# Patient Record
Sex: FEMALE | Race: BLACK OR AFRICAN AMERICAN | Employment: OTHER | URBAN - METROPOLITAN AREA
[De-identification: names, ages, dates, MRNs, and addresses within clinical notes are randomized per-mention and may not be internally consistent; named-entity substitution may affect disease eponyms.]

---

## 2017-08-07 ENCOUNTER — HOSPITAL ENCOUNTER (OUTPATIENT)
Dept: NUCLEAR MEDICINE | Age: 42
Discharge: HOME | End: 2017-09-02
Attending: INTERNAL MEDICINE | Admitting: INTERNAL MEDICINE

## 2017-08-11 ENCOUNTER — HOSPITAL ENCOUNTER (OUTPATIENT)
Dept: NUCLEAR MEDICINE | Age: 42
Discharge: HOME | End: 2017-08-11
Attending: INTERNAL MEDICINE | Admitting: INTERNAL MEDICINE

## 2017-08-11 DIAGNOSIS — R10.11 ABDOMINAL PAIN, RIGHT UPPER QUADRANT: ICD-10-CM

## 2017-08-11 RX ADMIN — Medication 6 MILLICURIE: at 08:50

## 2018-03-28 ENCOUNTER — HOSPITAL ENCOUNTER (OUTPATIENT)
Dept: INFUSION THERAPY | Age: 43
Discharge: HOME | End: 2018-03-31
Attending: PSYCHIATRY & NEUROLOGY | Admitting: PSYCHIATRY & NEUROLOGY

## 2018-03-28 VITALS
WEIGHT: 133 LBS | HEIGHT: 67 IN | BODY MASS INDEX: 20.88 KG/M2 | RESPIRATION RATE: 16 BRPM | SYSTOLIC BLOOD PRESSURE: 103 MMHG | HEART RATE: 68 BPM | TEMPERATURE: 99 F | DIASTOLIC BLOOD PRESSURE: 86 MMHG

## 2018-03-28 RX ORDER — GABAPENTIN 400 MG/1
400 CAPSULE ORAL 3 TIMES DAILY
COMMUNITY
End: 2020-11-02

## 2018-03-28 RX ORDER — SODIUM CHLORIDE 0.9 % (FLUSH) 0.9 %
10 SYRINGE (ML) INJECTION PRN
Status: ACTIVE | OUTPATIENT
Start: 2018-03-28 | End: 2018-03-28

## 2018-03-28 RX ADMIN — Medication 10 ML: at 09:53

## 2018-03-28 RX ADMIN — Medication 10 ML: at 11:05

## 2018-03-28 RX ADMIN — Medication 10 ML: at 09:52

## 2018-03-28 RX ADMIN — Medication 10 ML: at 09:00

## 2018-03-29 ENCOUNTER — HOSPITAL ENCOUNTER (OUTPATIENT)
Dept: INFUSION THERAPY | Age: 43
Discharge: HOME | End: 2018-03-29
Attending: PSYCHIATRY & NEUROLOGY

## 2018-03-29 VITALS
TEMPERATURE: 97.2 F | HEART RATE: 59 BPM | DIASTOLIC BLOOD PRESSURE: 60 MMHG | RESPIRATION RATE: 16 BRPM | SYSTOLIC BLOOD PRESSURE: 112 MMHG

## 2018-03-29 RX ORDER — METHYLPREDNISOLONE SODIUM SUCCINATE 1 G/16ML
1000 INJECTION, POWDER, LYOPHILIZED, FOR SOLUTION INTRAMUSCULAR; INTRAVENOUS ONCE
Status: DISCONTINUED | OUTPATIENT
Start: 2018-03-29 | End: 2018-03-29

## 2018-03-29 RX ORDER — SODIUM CHLORIDE 0.9 % (FLUSH) 0.9 %
10 SYRINGE (ML) INJECTION PRN
Status: DISCONTINUED | OUTPATIENT
Start: 2018-03-29 | End: 2018-03-30 | Stop reason: HOSPADM

## 2018-03-29 RX ORDER — SODIUM CHLORIDE 9 MG/ML
INJECTION, SOLUTION INTRAVENOUS
Status: COMPLETED
Start: 2018-03-29 | End: 2018-03-29

## 2018-03-29 RX ORDER — SODIUM CHLORIDE 9 MG/ML
INJECTION, SOLUTION INTRAVENOUS ONCE
Status: DISCONTINUED | OUTPATIENT
Start: 2018-03-29 | End: 2018-03-30 | Stop reason: HOSPADM

## 2018-03-29 RX ADMIN — SODIUM CHLORIDE 250 ML: 9 INJECTION, SOLUTION INTRAVENOUS at 08:55

## 2018-03-29 NOTE — DISCHARGE SUMMARY
Tolerated infusion of solumedrol and saline. Discharge instructions reviewed states understanding. Denies questions or concerns. Off unit per self gait steady.

## 2018-04-01 ENCOUNTER — HOSPITAL ENCOUNTER (OUTPATIENT)
Dept: OTHER | Age: 43
Discharge: HOME | End: 2018-04-30
Attending: PSYCHIATRY & NEUROLOGY | Admitting: PSYCHIATRY & NEUROLOGY

## 2020-09-02 ENCOUNTER — OFFICE VISIT (OUTPATIENT)
Dept: INTERNAL MEDICINE CLINIC | Age: 45
End: 2020-09-02
Payer: MEDICARE

## 2020-09-02 VITALS
WEIGHT: 150.2 LBS | TEMPERATURE: 97.6 F | BODY MASS INDEX: 23.57 KG/M2 | DIASTOLIC BLOOD PRESSURE: 82 MMHG | SYSTOLIC BLOOD PRESSURE: 130 MMHG | OXYGEN SATURATION: 100 % | HEIGHT: 67 IN | HEART RATE: 61 BPM

## 2020-09-02 PROBLEM — E55.9 VITAMIN D DEFICIENCY: Status: ACTIVE | Noted: 2020-09-02

## 2020-09-02 PROBLEM — G35 MULTIPLE SCLEROSIS (HCC): Status: ACTIVE | Noted: 2020-09-02

## 2020-09-02 PROCEDURE — G8420 CALC BMI NORM PARAMETERS: HCPCS | Performed by: FAMILY MEDICINE

## 2020-09-02 PROCEDURE — 1036F TOBACCO NON-USER: CPT | Performed by: FAMILY MEDICINE

## 2020-09-02 PROCEDURE — 99203 OFFICE O/P NEW LOW 30 MIN: CPT | Performed by: FAMILY MEDICINE

## 2020-09-02 PROCEDURE — G8427 DOCREV CUR MEDS BY ELIG CLIN: HCPCS | Performed by: FAMILY MEDICINE

## 2020-09-02 RX ORDER — CHOLECALCIFEROL (VITAMIN D3) 125 MCG
CAPSULE ORAL
Qty: 30 TABLET | Refills: 3 | Status: SHIPPED | OUTPATIENT
Start: 2020-09-02 | End: 2020-10-29 | Stop reason: SDUPTHER

## 2020-09-02 RX ORDER — FERROUS SULFATE 325(65) MG
325 TABLET ORAL
Qty: 180 TABLET | Refills: 1 | Status: SHIPPED | OUTPATIENT
Start: 2020-09-02

## 2020-09-02 RX ORDER — LANOLIN ALCOHOL/MO/W.PET/CERES
1000 CREAM (GRAM) TOPICAL DAILY
Qty: 30 TABLET | Refills: 3 | Status: SHIPPED | OUTPATIENT
Start: 2020-09-02

## 2020-09-02 ASSESSMENT — PATIENT HEALTH QUESTIONNAIRE - PHQ9
SUM OF ALL RESPONSES TO PHQ QUESTIONS 1-9: 0
2. FEELING DOWN, DEPRESSED OR HOPELESS: 0
SUM OF ALL RESPONSES TO PHQ9 QUESTIONS 1 & 2: 0
SUM OF ALL RESPONSES TO PHQ QUESTIONS 1-9: 0
1. LITTLE INTEREST OR PLEASURE IN DOING THINGS: 0

## 2020-09-02 NOTE — PROGRESS NOTES
Subjective:      Chief Complaint: Establish care, multiple sclerosis, falling here    HPI:  Edenilson Booth is a 54 y.o. female who presents today with below complaint:    Multiple sclerosis: Patient was diagnosed in 2018 with multiple sclerosis after having disbalance gait. Patient currently on chemotherapy for MS symptoms. Patient denies having any visual disturbances. Patient also noticed falling of hair since she has been taking the medication. Hair loss: For past 2 years, patient has been shedding hair. Her hair line has been reduced significantly. She also noticing that her hairs are brittle and falling off at the end. Abnormal EKG: Patient recent EKG in the hospital was normal.  Patient denies having chest pain or shortness of breath. Patient is interested in talking to cardiologist.    Patient Active Problem List   Diagnosis    Diverticulitis of large intestine with perforation and abscess with bleeding    Vitamin D deficiency    Multiple sclerosis ([address]       Past Medical History:   Diagnosis Date    Arthritis     CD (celiac disease)     Diverticulitis     Kidney stone     Ulcerated colon     Ulcerative [address] ([facility]         Social History     Tobacco Use    Smoking status: Never Smoker    Smokeless tobacco: Never Used   Substance Use Topics    Alcohol use: No        Family History   Problem Relation Age of Onset    Diabetes Mother     High Blood Pressure Mother     Cancer Father            [employer] Prostate Cancer Father 72    Heart Attack Maternal Uncle     Diabetes Maternal Grandmother     Heart Attack Maternal Grandmother         3 massive    Colon Cancer Maternal Grandfather     Cancer Paternal Grandfather         lung cance       Review of Systems   Constitutional: Negative for appetite change, chills, fatigue, fever and unexpected weight change. HENT: Negative for congestion, ear pain, sinus pain, sore throat and trouble swallowing.     Eyes: Negative for redness and itching. Respiratory: Negative for cough, chest tightness, shortness of breath and wheezing. Cardiovascular: Negative for chest pain and palpitations. Gastrointestinal: Negative for abdominal distention, abdominal pain, constipation, diarrhea, nausea and vomiting. Endocrine: Negative for polyuria. Genitourinary: Negative for difficulty urinating, dysuria, frequency and urgency. Musculoskeletal: Positive for back pain and gait problem. Negative for arthralgias and myalgias. Skin: Negative for rash. Neurological: Negative for dizziness and headaches. Psychiatric/Behavioral: Negative for behavioral problems, confusion and suicidal ideas. Objective:      /82   Pulse 61   Temp 97.6 °F (36.4 °C)   Ht 5' 7\" (1.702 m)   Wt 150 lb 3.2 oz (68.1 kg)   SpO2 100%   BMI 23.52 kg/m²      Physical Exam  Vitals signs reviewed. Constitutional:       Appearance: Normal appearance. She is well-developed. HENT:      Head: Normocephalic and atraumatic. Right Ear: External ear normal.      Left Ear: External ear normal.      Mouth/Throat:      Mouth: Mucous membranes are moist.      Pharynx: Oropharynx is clear. Eyes:      Extraocular Movements: Extraocular movements intact. Conjunctiva/sclera: Conjunctivae normal.      Pupils: Pupils are equal, round, and reactive to light. Neck:      Musculoskeletal: Normal range of motion and neck supple. Thyroid: No thyromegaly or thyroid tenderness. Vascular: No carotid bruit. Cardiovascular:      Rate and Rhythm: Normal rate and regular rhythm. Pulses: Normal pulses. Heart sounds: Normal heart sounds, S1 normal and S2 normal. No murmur. Pulmonary:      Effort: Pulmonary effort is normal.      Breath sounds: Normal breath sounds. Abdominal:      General: Bowel sounds are normal. There is no distension. Palpations: Abdomen is soft. Tenderness: There is no abdominal tenderness. There is no guarding. Hernia: No hernia is present. Comments: Soft, no hepatosplenomegaly   Musculoskeletal: Normal range of motion. Right lower leg: No edema. Left lower leg: No edema. Comments: 5-5 muscular strength throughout and bilateral.   Lymphadenopathy:      Cervical: No cervical adenopathy. Skin:     General: Skin is warm and dry. Findings: No rash. Neurological:      General: No focal deficit present. Mental Status: She is alert and oriented to person, place, and time. Sensory: No sensory deficit. Motor: No weakness. Psychiatric:         Mood and Affect: Mood normal.         Behavior: Behavior normal.         Thought Content: Thought content normal.         Judgment: Judgment normal.            Assessment / Plan:      1. Multiple sclerosis ([address]  -Stable. Continue follow-up with [facility]  - Suggested long bone broth and functional medicine    2. Abnormal EKG  - No EKG to review. Per patient wishes, sending the patient to cardiologist, Dr. Marlin Ahn MD, Cardiology, [facility]    3. Hair loss   I suggested oral iron supplement and over-the-counter medicine, Viviscal     - ferrous sulfate (IRON 325) 325 (65 Fe) MG tablet; Take 1 tablet by mouth daily (with breakfast) Take with orange  Dispense: 180 tablet; Refill: 1    4. Vitamin D deficiency  - Start the patient vitamin D supplement    - Cholecalciferol (VITAMIN D3) 50 MCG (2000 UT) TABS; Take one tablet by mouth once a day  Dispense: 30 tablet; Refill: 3    5. Fatigue, unspecified type  - vitamin B-12 (CYANOCOBALAMIN) 1000 MCG tablet; Take 1 tablet by mouth daily  Dispense: 30 tablet; Refill: 3  - CBC WITH AUTO DIFFERENTIAL  - COMPREHENSIVE METABOLIC PANEL  - T4, FREE  - TSH without Reflex    6. Encounter to establish care with new doctor  -Stable and fair health. Note:   Patient understand the assessment and agreed to the plan. Medication side effects was discussed during the encounter.  Before discharging the patient, all questions and concern were addressed. After visit summary was provided. Advice to call clinic or me for any further questions or concern.        Eb Strickland, DO

## 2020-09-03 ASSESSMENT — ENCOUNTER SYMPTOMS
SINUS PAIN: 0
BACK PAIN: 1
SORE THROAT: 0
EYE REDNESS: 0
TROUBLE SWALLOWING: 0
WHEEZING: 0
VOMITING: 0
ABDOMINAL PAIN: 0
SHORTNESS OF BREATH: 0
ABDOMINAL DISTENTION: 0
DIARRHEA: 0
NAUSEA: 0
CONSTIPATION: 0
COUGH: 0
CHEST TIGHTNESS: 0
EYE ITCHING: 0

## 2020-09-04 LAB
A/G RATIO: 1.7 (ref 1.1–2.2)
ALBUMIN SERPL-MCNC: 4.5 G/DL (ref 3.4–5)
ALP BLD-CCNC: 31 U/L (ref 40–129)
ALT SERPL-CCNC: 11 U/L (ref 10–40)
ANION GAP SERPL CALCULATED.3IONS-SCNC: 9 MMOL/L (ref 3–16)
AST SERPL-CCNC: 16 U/L (ref 15–37)
BASOPHILS ABSOLUTE: 0.1 K/UL (ref 0–0.2)
BASOPHILS RELATIVE PERCENT: 0.9 %
BILIRUB SERPL-MCNC: <0.2 MG/DL (ref 0–1)
BUN BLDV-MCNC: 15 MG/DL (ref 7–20)
CALCIUM SERPL-MCNC: 9.4 MG/DL (ref 8.3–10.6)
CHLORIDE BLD-SCNC: 101 MMOL/L (ref 99–110)
CO2: 29 MMOL/L (ref 21–32)
CREAT SERPL-MCNC: 0.8 MG/DL (ref 0.6–1.1)
EOSINOPHILS ABSOLUTE: 0.1 K/UL (ref 0–0.6)
EOSINOPHILS RELATIVE PERCENT: 1.6 %
GFR AFRICAN AMERICAN: >60
GFR NON-AFRICAN AMERICAN: >60
GLOBULIN: 2.7 G/DL
GLUCOSE BLD-MCNC: 77 MG/DL (ref 70–99)
HCT VFR BLD CALC: 38.6 % (ref 36–48)
HEMOGLOBIN: 13 G/DL (ref 12–16)
LYMPHOCYTES ABSOLUTE: 2.6 K/UL (ref 1–5.1)
LYMPHOCYTES RELATIVE PERCENT: 39.9 %
MCH RBC QN AUTO: 32.9 PG (ref 26–34)
MCHC RBC AUTO-ENTMCNC: 33.5 G/DL (ref 31–36)
MCV RBC AUTO: 98 FL (ref 80–100)
MONOCYTES ABSOLUTE: 0.4 K/UL (ref 0–1.3)
MONOCYTES RELATIVE PERCENT: 6.7 %
NEUTROPHILS ABSOLUTE: 3.3 K/UL (ref 1.7–7.7)
NEUTROPHILS RELATIVE PERCENT: 50.9 %
PDW BLD-RTO: 12.4 % (ref 12.4–15.4)
PLATELET # BLD: 256 K/UL (ref 135–450)
PMV BLD AUTO: 9.2 FL (ref 5–10.5)
POTASSIUM SERPL-SCNC: 4.3 MMOL/L (ref 3.5–5.1)
RBC # BLD: 3.94 M/UL (ref 4–5.2)
SODIUM BLD-SCNC: 139 MMOL/L (ref 136–145)
T4 FREE: 1.1 NG/DL (ref 0.9–1.8)
TOTAL PROTEIN: 7.2 G/DL (ref 6.4–8.2)
TSH SERPL DL<=0.05 MIU/L-ACNC: 1.22 UIU/ML (ref 0.27–4.2)
WBC # BLD: 6.4 K/UL (ref 4–11)

## 2020-09-11 ENCOUNTER — INITIAL CONSULT (OUTPATIENT)
Dept: CARDIOLOGY CLINIC | Age: 45
End: 2020-09-11
Payer: MEDICARE

## 2020-09-11 VITALS
WEIGHT: 151.4 LBS | HEIGHT: 67 IN | HEART RATE: 88 BPM | DIASTOLIC BLOOD PRESSURE: 98 MMHG | SYSTOLIC BLOOD PRESSURE: 140 MMHG | BODY MASS INDEX: 23.76 KG/M2

## 2020-09-11 PROCEDURE — 99203 OFFICE O/P NEW LOW 30 MIN: CPT | Performed by: INTERNAL MEDICINE

## 2020-09-11 PROCEDURE — G8427 DOCREV CUR MEDS BY ELIG CLIN: HCPCS | Performed by: INTERNAL MEDICINE

## 2020-09-11 PROCEDURE — G8420 CALC BMI NORM PARAMETERS: HCPCS | Performed by: INTERNAL MEDICINE

## 2020-09-11 NOTE — PROGRESS NOTES
CARDIOLOGY NOTE      9/11/2020    RE: Lyn Spurling  (1975)                               TO:  Dr. Ronda Gonzales,             Perri Nieves is a 54 y.o. female who was seen today for management of  Abn EKG                                    HPI:                   The patient has cardiac complaints of lt sided numbness saw PCP was referred here  Patient also seen  for    - abn EKG  No issues    Kim Spurling has the following history recorded in care path:  Patient Active Problem List    Diagnosis Date Noted    Diverticulitis of large intestine with perforation and abscess with bleeding 10/09/2015     Priority: High     Class: Acute    Vitamin D deficiency 09/02/2020    Multiple sclerosis ([facility] [address] 09/02/2020     Current Outpatient Medications   Medication Sig Dispense Refill    Cholecalciferol (VITAMIN D3) 50 MCG (2000 UT) TABS Take one tablet by mouth once a day 30 tablet 3    vitamin B-12 (CYANOCOBALAMIN) 1000 MCG tablet Take 1 tablet by mouth daily 30 tablet 3    ferrous sulfate (IRON 325) 325 (65 Fe) MG tablet Take 1 tablet by mouth daily (with breakfast) Take with orange 180 tablet 1    gabapentin (NEURONTIN) 400 MG capsule Take 400 mg by mouth 3 times daily.  Probiotic Product (PROBIOTIC DAILY PO) Take by mouth       No current facility-administered medications for this visit.       Allergies: Bactrim [sulfamethoxazole-trimethoprim]; Sulfa antibiotics; and Tape Reggy Ill tape]  Past Medical History:   Diagnosis Date    Arthritis     CD (celiac disease)     Diverticulitis     Kidney stone     Ulcerated colon     Ulcerative colitis ([facility] [address]      Past Surgical History:   Procedure Laterality Date    ABDOMEN SURGERY      COLECTOMY      removed    COLONOSCOPY      DILATATION, ESOPHAGUS      ENDOSCOPY, COLON, DIAGNOSTIC      GASTRIC BYPASS SURGERY      POUCH    HYSTERECTOMY      SKIN BIOPSY        As reviewed   Family History   Problem Relation Age of Onset    Diabetes Mother     High Blood Pressure Mother     Cancer Father            Marianne Loya Prostate Cancer Father 72    Heart Attack Maternal Uncle     Diabetes Maternal Grandmother     Heart Attack Maternal Grandmother         3 massive    Colon Cancer Maternal Grandfather     Cancer Paternal Grandfather         lung cance     Social History     Tobacco Use    Smoking status: Never Smoker    Smokeless tobacco: Never Used   Substance Use Topics    Alcohol use: No      Review of Systems:    Constitutional: Negative for diaphoresis and fatigue  Psychological:Negative for anxiety or depression  HENT: Negative for headaches, nasal congestion, sinus pain or vertigo  Eyes: Negative for visual disturbance. Endocrine: Negative for polydipsia/polyuria  Respiratory: Negative for shortness of breath  Cardiovascular:  cpGastrointestinal: Negative for abdominal pain or heartburn  Genito-Urinary: Negative for urinary frequency/urgency  Musculoskeletal: Negative for muscle pain, muscular weakness, negative for pain in arm and leg or swelling in foot and leg  Neurological: Negative for dizziness, headaches, memory loss, numbness/tingling, visual changes, syncope  Dermatological: Negative for rash    Objective:    Vitals:    20 1000   BP: (!) 140/98   Pulse: 88   Weight: 151 lb 6.4 oz (68.7 kg)   Height: 5' 7\" (1.702 m)     BP (!) 140/98   Pulse 88   Ht 5' 7\" (1.702 m)   Wt 151 lb 6.4 oz (68.7 kg)   BMI 23.71 kg/m²     No flowsheet data found. Wt Readings from Last 3 Encounters:   20 151 lb 6.4 oz (68.7 kg)   20 150 lb 3.2 oz (68.1 kg)   18 133 lb (60.3 kg)     Body mass index is 23.71 kg/m². GENERAL - Alert, oriented, pleasant, in no apparent distress. EYES: No jaundice, no conjunctival pallor. SKIN: It is warm & dry. No rashes. No Echhymosis    HEENT - No clinically significant abnormalities seen. Neck - Supple. No jugular venous distention noted. No carotid bruits.    Cardiovascular - Normal S1 and S2 without obvious murmur or gallop. Extremities - No cyanosis, clubbing, or significant edema. Pulmonary - No respiratory distress. No wheezes or rales. Abdomen - No masses, tenderness, or organomegaly. Musculoskeletal - No significant edema. No joint deformities. No muscle wasting. Neurologic - Cranial nerves II through XII are grossly intact. There were no gross focal neurologic abnormalities. Lab Review   No results found for: CKTOTAL, CKMB, CKMBINDEX, TROPONINT  BNP:  No results found for: BNP  PT/INR:  No results found for: INR  No results found for: LABA1C  Lab Results   Component Value Date    WBC 6.4 09/03/2020    HCT 38.6 09/03/2020    MCV 98.0 09/03/2020     09/03/2020     Lab Results   Component Value Date    CHOL 232 (H) 10/14/2010    TRIG 226 (H) 10/14/2010    HDL 66 10/14/2010    LDLDIRECT 150 (H) 10/14/2010     Lab Results   Component Value Date    ALT 11 09/03/2020    AST 16 09/03/2020     BMP:    Lab Results   Component Value Date     09/03/2020    K 4.3 09/03/2020     09/03/2020    CO2 29 09/03/2020    BUN 15 09/03/2020    CREATININE 0.8 09/03/2020     CMP:   Lab Results   Component Value Date     09/03/2020    K 4.3 09/03/2020     09/03/2020    CO2 29 09/03/2020    BUN 15 09/03/2020    PROT 7.2 09/03/2020    PROT 8.1 10/12/2011     TSH:    Lab Results   Component Value Date    TSH 1.22 09/03/2020    TSHHS 1.895 10/14/2010         Impression:    1. Chest pain, unspecified type       Patient Active Problem List   Diagnosis Code    Diverticulitis of large intestine with perforation and abscess with bleeding K57.21    Vitamin D deficiency E55.9    Multiple sclerosis ([facility] [address] G35       Assessment & Plan:    - ?  TIA  holter    - CP atypical  ETT        Shaquille Mayes MD    [facility] [place]

## 2020-09-11 NOTE — LETTER
Cait Sims  1975  [id number]    Have you had any Chest Pain - No      Have you had any Shortness of Breath - No      Have you had any dizziness - No      Have you had any palpitations - Yes  If Yes DO EKG - Do you feel your heart racing  How long does it last - minutes     Is the patient on any of the following medications -   If Yes DO EKG    Do you have any edema - no    Do you have a surgery or procedure scheduled in the near future - No    Ask patient [facility] if [facility] are not already [facility]    Check to see if we have an E-MAIL on file for the patient    Check medication list thoroughly!!!  BE SURE TO ASK PATIENT IF THEY NEED MEDICATION REFILLS

## 2020-09-11 NOTE — PATIENT INSTRUCTIONS
Please hold on to these instructions the  will call you within 1-9 business days when we receive authorization from your insurance. Treadmill Stress test    WHAT TO EXPECT:     The exercise stress test is a test used to provide information about how the heart responds to exertion. It involves walking on a treadmill at increasing levels of difficulty, while electrocardiogram, heart rate, and blood pressure are monitored. ? This test will take approximately 1 hours: Please arrive at the office 5-10 min before the scheduled testing time. ? Once you are taken back to the stress lab you will be asked to read and sign a consent before proceeding with the test. At this time feel free to ask any question that you may have as the procedure is explained to you.   ? You will be attached to the EKG monitoring equipment, your blood pressure will be taken, and you will begin walking on the treadmill. The treadmill starts off slowly and every 3 minutes the treadmill speeds up and the elevation increases. The average person usually walks for a period of 6-8min. PREPARATION FOR TEST:    ? Eat a light meal such as juice and toast at least 2 hours prior to the procedure. ? AVOID CAFFEINE 24 HOURS PRIOR TO THE TEST: Including coffee, Tea, Lianet and other soft drinks even those labeled  caffeine free or decaffeinated. ? Please wear loose comfortable clothing and comfortable walking shoes. Please wear a short sleeved shirt. Please shower or bath and do not apply powder or lotion to the skin prior to testing, as the electrodes will adhere better giving us a clearer visual EKG recording.

## 2020-09-14 ENCOUNTER — PROCEDURE VISIT (OUTPATIENT)
Dept: CARDIOLOGY CLINIC | Age: 45
End: 2020-09-14
Payer: MEDICARE

## 2020-09-14 ENCOUNTER — NURSE ONLY (OUTPATIENT)
Dept: CARDIOLOGY CLINIC | Age: 45
End: 2020-09-14
Payer: MEDICARE

## 2020-09-14 VITALS
HEART RATE: 82 BPM | SYSTOLIC BLOOD PRESSURE: 100 MMHG | DIASTOLIC BLOOD PRESSURE: 60 MMHG | BODY MASS INDEX: 23.7 KG/M2 | WEIGHT: 151 LBS | HEIGHT: 67 IN

## 2020-09-14 PROCEDURE — 93228 REMOTE 30 DAY ECG REV/REPORT: CPT | Performed by: INTERNAL MEDICINE

## 2020-09-14 PROCEDURE — 93015 CV STRESS TEST SUPVJ I&R: CPT | Performed by: INTERNAL MEDICINE

## 2020-09-14 NOTE — PROGRESS NOTES
Applied @ 415, 24hr holter w/monitor# [id number] for Dx of irr. HR. Educated pt on proper holter usage; how to keep sx diary; & when to bring monitor back to office. Pt voiced understanding. Holter order,including monitor & card#, & time started, to front nurse's station in 's in-box.

## 2020-09-22 ENCOUNTER — TELEPHONE (OUTPATIENT)
Dept: CARDIOLOGY CLINIC | Age: 45
End: 2020-09-22

## 2020-09-23 ENCOUNTER — TELEPHONE (OUTPATIENT)
Dept: CARDIOLOGY CLINIC | Age: 45
End: 2020-09-23

## 2020-10-29 RX ORDER — CHOLECALCIFEROL (VITAMIN D3) 125 MCG
CAPSULE ORAL
Qty: 30 TABLET | Refills: 1 | Status: SHIPPED | OUTPATIENT
Start: 2020-10-29 | End: 2020-11-25

## 2020-11-02 ENCOUNTER — HOSPITAL ENCOUNTER (EMERGENCY)
Age: 45
Discharge: HOME | End: 2020-11-02
Attending: EMERGENCY MEDICINE
Payer: MEDICARE

## 2020-11-02 VITALS
DIASTOLIC BLOOD PRESSURE: 90 MMHG | RESPIRATION RATE: 16 BRPM | TEMPERATURE: 98.5 F | WEIGHT: 147 LBS | HEIGHT: 67 IN | SYSTOLIC BLOOD PRESSURE: 128 MMHG | BODY MASS INDEX: 23.07 KG/M2 | OXYGEN SATURATION: 100 % | HEART RATE: 73 BPM

## 2020-11-02 PROCEDURE — 99283 EMERGENCY DEPT VISIT LOW MDM: CPT

## 2020-11-02 RX ORDER — HYDROCODONE BITARTRATE AND ACETAMINOPHEN 7.5; 325 MG/1; MG/1
1 TABLET ORAL
COMMUNITY
Start: 2020-10-06

## 2020-11-02 RX ORDER — NICOTINE POLACRILEX 2 MG
1 GUM BUCCAL DAILY
COMMUNITY

## 2020-11-02 RX ORDER — FLUCONAZOLE 100 MG/1
200 TABLET ORAL DAILY
Qty: 6 TABLET | Refills: 0 | Status: SHIPPED | OUTPATIENT
Start: 2020-11-02 | End: 2020-11-05

## 2020-11-02 RX ORDER — RIFAXIMIN 550 MG/1
550 TABLET ORAL 2 TIMES DAILY
COMMUNITY
Start: 2020-09-30

## 2020-11-02 RX ORDER — GABAPENTIN 600 MG/1
600 TABLET ORAL 4 TIMES DAILY
COMMUNITY
Start: 2020-10-21

## 2020-11-02 ASSESSMENT — PAIN DESCRIPTION - PAIN TYPE: TYPE: ACUTE PAIN

## 2020-11-02 ASSESSMENT — PAIN DESCRIPTION - FREQUENCY: FREQUENCY: INTERMITTENT

## 2020-11-02 ASSESSMENT — PAIN DESCRIPTION - DESCRIPTORS: DESCRIPTORS: BURNING;DISCOMFORT

## 2020-11-02 ASSESSMENT — PAIN DESCRIPTION - LOCATION: LOCATION: VAGINA

## 2020-11-02 ASSESSMENT — PAIN DESCRIPTION - PROGRESSION: CLINICAL_PROGRESSION: GRADUALLY WORSENING

## 2020-11-02 ASSESSMENT — PAIN SCALES - GENERAL: PAINLEVEL_OUTOF10: 4

## 2020-11-02 ASSESSMENT — PAIN DESCRIPTION - ONSET: ONSET: ON-GOING

## 2020-11-02 NOTE — ED PROVIDER NOTES
Occupational History    Not on file   Social Needs    Financial resource strain: Not on file    Food insecurity     Worry: Not on file     Inability: Not on file    Transportation needs     Medical: Not on file     Non-medical: Not on file   Tobacco Use    Smoking status: Never Smoker    Smokeless tobacco: Never Used   Substance and Sexual Activity    Alcohol use: No    Drug use: No    Sexual activity: Not on file     Comment: deferred   Lifestyle    Physical activity     Days per week: Not on file     Minutes per session: Not on file    Stress: Not on file   Relationships    Social connections     Talks on phone: Not on file     Gets together: Not on file     Attends Zoroastrianism service: Not on file     Active member of club or organization: Not on file     Attends meetings of clubs or organizations: Not on file     Relationship status: Not on file    Intimate partner violence     Fear of current or ex partner: Not on file     Emotionally abused: Not on file     Physically abused: Not on file     Forced sexual activity: Not on file   Other Topics Concern    Not on file   Social History Narrative    Not on file     No current facility-administered medications for this encounter. Current Outpatient Medications   Medication Sig Dispense Refill    Biotin 1 MG CAPS Take 1 mg by mouth daily      estradiol (CLIMARA) 0.1 MG/24HR Place 1 patch onto the skin once a week      gabapentin (NEURONTIN) 600 MG tablet Take 600 mg by mouth 4 times daily.  HYDROcodone-acetaminophen (NORCO) 7.5-325 MG per tablet Take 1 tablet by mouth every 4-6 hours as needed.       XIFAXAN 550 MG tablet Take 550 mg by mouth 2 times daily      fluconazole (DIFLUCAN) 100 MG tablet Take 2 tablets by mouth daily for 3 days 6 tablet 0    Cholecalciferol (VITAMIN D3) 50 MCG (2000 UT) TABS Take one tablet by mouth once a day 30 tablet 1    vitamin B-12 (CYANOCOBALAMIN) 1000 MCG tablet Take 1 tablet by mouth daily 30 tablet 3  ferrous sulfate (IRON 325) 325 (65 Fe) MG tablet Take 1 tablet by mouth daily (with breakfast) Take with orange 180 tablet 1    Probiotic Product (PROBIOTIC DAILY PO) Take by mouth       Allergies   Allergen Reactions    Bactrim [Sulfamethoxazole-Trimethoprim]     Sulfa Antibiotics     Tape April Mean Tape] Rash         ROS:    Review of Systems   Genitourinary: Positive for vaginal discharge. All other systems reviewed and are negative. Nursing Notes Reviewed    Physical Exam:  ED Triage Vitals [11/02/20 1225]   Enc Vitals Group      BP (!) 128/90      Pulse 73      Resp 16      Temp 98.5 °F (36.9 °C)      Temp Source Oral      SpO2 100 %      Weight 147 lb (66.7 kg)      Height 5' 7\" (1.702 m)      Head Circumference       Peak Flow       Pain Score       Pain Loc       Pain Edu? Excl. in [address]? Physical Exam  Vitals signs and nursing note reviewed. Exam conducted with a chaperone present. Constitutional:       Appearance: She is well-developed. HENT:      Head: Normocephalic and atraumatic. Nose:      Comments: No signs of thrush     Mouth/Throat:      Mouth: Mucous membranes are moist.   Eyes:      Pupils: Pupils are equal, round, and reactive to light. Neck:      Musculoskeletal: Normal range of motion and neck supple. Abdominal:      Hernia: There is no hernia in the left inguinal area or right inguinal area. Genitourinary:     General: Normal vulva. Exam position: Lithotomy position. Vagina: Vaginal discharge present. Cervix: Normal.      Uterus: Normal.    Musculoskeletal: Normal range of motion. Lymphadenopathy:      Lower Body: No right inguinal adenopathy. Skin:     General: Skin is warm and dry. Neurological:      Mental Status: She is alert and oriented to person, place, and time. I have reviewed and interpreted all of the currently available lab results from this visit (ifapplicable):  No results found for this visit on 11/02/20.

## 2020-11-16 ENCOUNTER — TELEMEDICINE (OUTPATIENT)
Dept: CARDIOLOGY CLINIC | Age: 45
End: 2020-11-16
Payer: MEDICARE

## 2020-11-16 PROCEDURE — 99213 OFFICE O/P EST LOW 20 MIN: CPT | Performed by: INTERNAL MEDICINE

## 2020-11-16 PROCEDURE — G8427 DOCREV CUR MEDS BY ELIG CLIN: HCPCS | Performed by: INTERNAL MEDICINE

## 2020-11-16 NOTE — PROGRESS NOTES
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 female who was seen today for management of  CP                                    HPI:                   The patient does not have cardiac complaints  Patient also seen  for    - fu on tests were normal    [facility] has the following history recorded in care path:  Patient Active Problem List    Diagnosis Date Noted    Diverticulitis of large intestine with perforation and abscess with bleeding 10/09/2015     Priority: High     Class: Acute    Abnormal EKG     Left sided numbness     Chest pain     FHx: coronary artery disease     Vitamin D deficiency 09/02/2020    Multiple sclerosis ([facility] [address] 09/02/2020     Current Outpatient Medications   Medication Sig Dispense Refill    Biotin 1 MG CAPS Take 1 mg by mouth daily      estradiol (CLIMARA) 0.1 MG/24HR Place 1 patch onto the skin once a week      gabapentin (NEURONTIN) 600 MG tablet Take 600 mg by mouth 4 times daily.  HYDROcodone-acetaminophen (NORCO) 7.5-325 MG per tablet Take 1 tablet by mouth every 4-6 hours as needed.  XIFAXAN 550 MG tablet Take 550 mg by mouth 2 times daily      Cholecalciferol (VITAMIN D3) 50 MCG (2000 UT) TABS Take one tablet by mouth once a day 30 tablet 1    vitamin B-12 (CYANOCOBALAMIN) 1000 MCG tablet Take 1 tablet by mouth daily 30 tablet 3    ferrous sulfate (IRON 325) 325 (65 Fe) MG tablet Take 1 tablet by mouth daily (with breakfast) Take with orange 180 tablet 1    Probiotic Product (PROBIOTIC DAILY PO) Take by mouth       No current facility-administered medications for this visit.       Allergies: Bactrim [sulfamethoxazole-trimethoprim]; Sulfa antibiotics; and Tape [adhesive tape]  Past Medical History:   Diagnosis Date    Abnormal EKG     Arthritis     CD (celiac disease)     Chest pain     Diverticulitis     FHx: R07.9    FHx: coronary artery disease Z82.49       Assessment & Plan:    - CP; normal tests, normal stress    I confirm that this visit was completed in a telehealth setting ,using synchronous audiovisual technology for real time patient interaction . The patient identity with name and date of birth was confirmed . This evaluation of patient was done by telehealth in the setting of [address] , which precluded assurance of safe in person visit at the time of service. The patient consented to and accepts potential risks associated with telemedical evaluation and care was taken to assess sonido presence of any medical issues that would be more  appropriate for expedited in -person care. Pursuant to the emergency declaration under the [address] [place] [address], [facility] waiver authority and the [employer] Act, this Virtual  Visit was conducted, with patient's consent, to reduce the patient's risk of exposure to COVID-19 and provide continuity of care for an established patient. Services were provided through a video synchronous discussion virtually to substitute for in-person clinic visit. I Confirm this is a Patient Initiated Episode with an Established Patient who has not had a related appointment within my department in the past 7 days or scheduled within the next 24 hours. The call was not tied to face to face office visit or procedure that has occurred in in prior 7 days.   Based on our conversation /evaluation , a subsequent office visit for the patient's problem is not indicated for  24 hrs      Time spent; 15 m  Location; [facility], [address], [address] [facility]  Office staff ie MA were utilised   micheal Soliz MD    [facility] [place]

## 2020-11-16 NOTE — LETTER
[address] [facility] [place]  1975  [id number]    Have you had any Chest Pain that is not new? - No    ? DO EKG IF: Patient has a Heart Rate above 100 or below 40     CAD (Coronary Artery Disease) patient should have one on file every 6 months        Have you had any Shortness of Breath - No      Have you had any dizziness - No    ? Sitting wait 5 minutes do supine (laying down) wait 5 minutes then do standing - log each in \"vitals\" area in Epic  ? Be sure to ask what symptoms they are having if they get dizzy while completing ortho stats such as room spinning, nausea, etc.    Have you had any palpitations that are not new? - No      Do you have any edema - swelling in No        Vein \"LEG PROBLEM Questionnaire\"  1. Do you have prominent leg veins? No   2. Do you have any skin discoloration? No  3. Do you have any healed or active sores? No  4. Do you have swelling of the lefts? No  5. Do you have family history of varicose veins? No  6. Does your profession involve pro-longed        standing or heavy lifting? No  7. Have you been fighting overweight problems? No  8. Do you have restless legs? No  9. Do you have any night time cramps? No  10.  Do you have any of the following in your legs: No           Do you have a surgery or procedure scheduled in the near future - No

## 2021-01-06 DIAGNOSIS — E55.9 VITAMIN D DEFICIENCY: ICD-10-CM

## 2021-01-06 RX ORDER — CHOLECALCIFEROL (VITAMIN D3) 50 MCG
TABLET ORAL
Qty: 30 TABLET | Refills: 1 | Status: SHIPPED | OUTPATIENT
Start: 2021-01-06

## 2021-04-23 ENCOUNTER — HOSPITAL ENCOUNTER (OUTPATIENT)
Age: 46
Setting detail: SPECIMEN
Discharge: HOME | End: 2021-04-23
Payer: MEDICARE

## 2021-04-23 LAB
ADENOVIRUS F 40 41 PCR: NOT DETECTED
ASTROVIRUS PCR: NOT DETECTED
CAMPYLOBACTER PCR: NOT DETECTED
CRYPTOSPORIDIUM PCR: NOT DETECTED
CYCLOSPORA CAYETANENSIS PCR: NOT DETECTED
E COLI 0157 PCR: NOT DETECTED
E COLI ENTEROAGGREGATIVE PCR: NOT DETECTED
E COLI ENTEROPATHOGENIC PCR: NOT DETECTED
E COLI ENTEROTOXIGENIC PCR: NOT DETECTED
E COLI SHIGA LIKE TOXIN PCR: NOT DETECTED
E COLI SHIGELLA/ENTEROINVASIVE PCR: NOT DETECTED
ENTAMOEBA HISTOLYTICA PCR: NOT DETECTED
GIARDIA LAMBLIA PCR: NOT DETECTED
NOROVIRUS GI GII PCR: NOT DETECTED
PLESIOMONAS SHIGELLOIDES PCR: NOT DETECTED
ROTAVIRUS A PCR: NOT DETECTED
SALMONELLA PCR: NOT DETECTED
SAPOVIRUS PCR: NOT DETECTED
VIBRIO CHOLERAE PCR: NOT DETECTED
VIBRIO PCR: NOT DETECTED
YERSINIA ENTEROCOLITICA PCR: NOT DETECTED

## 2021-04-23 PROCEDURE — 87507 IADNA-DNA/RNA PROBE TQ 12-25: CPT

## 2021-04-23 PROCEDURE — 87177 OVA AND PARASITES SMEARS: CPT

## 2021-04-23 PROCEDURE — 87324 CLOSTRIDIUM AG IA: CPT

## 2021-04-23 PROCEDURE — 83993 ASSAY FOR CALPROTECTIN FECAL: CPT

## 2021-04-23 PROCEDURE — 87209 SMEAR COMPLEX STAIN: CPT

## 2021-04-24 LAB
REASON FOR REJECTION: NORMAL
REJECTED TEST: NORMAL

## 2021-04-27 LAB
CALPROTECTIN, FECAL: 14 UG/G
INTERPRETATION: NEGATIVE

## 2021-11-15 ENCOUNTER — OFFICE VISIT (OUTPATIENT)
Dept: CARDIOLOGY CLINIC | Age: 46
End: 2021-11-15
Payer: MEDICARE

## 2021-11-15 VITALS
BODY MASS INDEX: 21.35 KG/M2 | HEART RATE: 73 BPM | HEIGHT: 67 IN | SYSTOLIC BLOOD PRESSURE: 110 MMHG | DIASTOLIC BLOOD PRESSURE: 78 MMHG | WEIGHT: 136 LBS

## 2021-11-15 DIAGNOSIS — R94.31 ABNORMAL EKG: Primary | ICD-10-CM

## 2021-11-15 DIAGNOSIS — R07.9 CHEST PAIN, UNSPECIFIED TYPE: ICD-10-CM

## 2021-11-15 DIAGNOSIS — R06.02 SHORTNESS OF BREATH: ICD-10-CM

## 2021-11-15 DIAGNOSIS — R00.2 PALPITATIONS: ICD-10-CM

## 2021-11-15 PROCEDURE — G8427 DOCREV CUR MEDS BY ELIG CLIN: HCPCS | Performed by: INTERNAL MEDICINE

## 2021-11-15 PROCEDURE — 93000 ELECTROCARDIOGRAM COMPLETE: CPT | Performed by: INTERNAL MEDICINE

## 2021-11-15 PROCEDURE — 1036F TOBACCO NON-USER: CPT | Performed by: INTERNAL MEDICINE

## 2021-11-15 PROCEDURE — 99213 OFFICE O/P EST LOW 20 MIN: CPT | Performed by: INTERNAL MEDICINE

## 2021-11-15 PROCEDURE — G8484 FLU IMMUNIZE NO ADMIN: HCPCS | Performed by: INTERNAL MEDICINE

## 2021-11-15 PROCEDURE — G8420 CALC BMI NORM PARAMETERS: HCPCS | Performed by: INTERNAL MEDICINE

## 2021-11-15 NOTE — PATIENT INSTRUCTIONS
**It is YOUR responsibilty to bring medication bottles and/or updated medication list to [address] [place] [address]. This will allow us to better serve you and all your healthcare needs**  Please be informed that if you contact our office outside of normal business hours the physician on call cannot help with any scheduling or rescheduling issues, procedure instruction questions or any type of medication issue. We advise you for any urgent/emergency that you go to the nearest emergency room!     PLEASE CALL OUR OFFICE DURING NORMAL BUSINESS HOURS    Monday - Friday   8 am to 5 pm    [place]: Sherine 12: [phone]    Robert:  [phone]

## 2021-11-15 NOTE — PROGRESS NOTES
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 who was seen today for management of  cp                                    HPI:                   The patient does not have cardiac complaints  Patient also seen  for    - CP normal cardiac tim  - Has MS  On chemo    Lata Bronson has the following history recorded in care path:  Patient Active Problem List    Diagnosis Date Noted    Diverticulitis of large intestine with perforation and abscess with bleeding 10/09/2015    Abnormal EKG     Left sided numbness     Chest pain     FHx: coronary artery disease     Vitamin D deficiency 09/02/2020    Multiple sclerosis ([facility] [address] 09/02/2020     Current Outpatient Medications   Medication Sig Dispense Refill    vitamin D (CHOLECALCIFEROL) 50 MCG (2000 UT) TABS tablet TAKE 1 TABLET BY MOUTH EVERY DAY 30 tablet 1    Biotin 1 MG CAPS Take 1 mg by mouth daily      estradiol (CLIMARA) 0.1 MG/24HR Place 1 patch onto the skin once a week      gabapentin (NEURONTIN) 600 MG tablet Take 600 mg by mouth 4 times daily.  HYDROcodone-acetaminophen (NORCO) 7.5-325 MG per tablet Take 1 tablet by mouth every 4-6 hours as needed.  XIFAXAN 550 MG tablet Take 550 mg by mouth 2 times daily      vitamin B-12 (CYANOCOBALAMIN) 1000 MCG tablet Take 1 tablet by mouth daily 30 tablet 3    ferrous sulfate (IRON 325) 325 (65 Fe) MG tablet Take 1 tablet by mouth daily (with breakfast) Take with orange 180 tablet 1    Probiotic Product (PROBIOTIC DAILY PO) Take by mouth       No current facility-administered medications for this visit.      Allergies: Bactrim [sulfamethoxazole-trimethoprim], Sulfa antibiotics, and Tape [adhesive tape]  Past Medical History:   Diagnosis Date    Abnormal EKG     Arthritis     CD (celiac disease)     Chest pain     Diverticulitis     FHx: coronary artery disease  History of exercise stress test 2020    Treadmill,  Physiological BP response to exercise. ETT negative for Ischemia / Arrhythmia.  Kidney stone     Left sided numbness     MS (multiple sclerosis) (HCC)     Ulcerated colon     Ulcerative colitis ([facility] [address]      Past Surgical History:   Procedure Laterality Date    ABDOMEN SURGERY      COLECTOMY      removed    COLONOSCOPY      DILATATION, ESOPHAGUS      ENDOSCOPY, COLON, DIAGNOSTIC      GASTRIC BYPASS SURGERY      POUCH    HYSTERECTOMY      SKIN BIOPSY        As reviewed   Family History   Problem Relation Age of Onset    Diabetes Mother     High Blood Pressure Mother     Cancer Father            [facility] Prostate Cancer Father 72    Heart Attack Maternal Uncle     Diabetes Maternal Grandmother     Heart Attack Maternal Grandmother         3 massive    Colon Cancer Maternal Grandfather     Cancer Paternal Grandfather         lung cance     Social History     Tobacco Use    Smoking status: Never Smoker    Smokeless tobacco: Never Used   Substance Use Topics    Alcohol use: No      Review of Systems:    Constitutional: Negative for diaphoresis and fatigue  Psychological:Negative for anxiety or depression  HENT: Negative for headaches, nasal congestion, sinus pain or vertigo  Eyes: Negative for visual disturbance.    Endocrine: Negative for polydipsia/polyuria  Respiratory: Negative for shortness of breath  Cardiovascular: Negative for chest pain, dyspnea on exertion, claudication, edema, irregular heartbeat, murmur, palpitations or shortness of breath  Gastrointestinal: Negative for abdominal pain or heartburn  Genito-Urinary: Negative for urinary frequency/urgency  Musculoskeletal: Negative for muscle pain, muscular weakness, negative for pain in arm and leg or swelling in foot and leg  Neurological: Negative for dizziness, headaches, memory loss, numbness/tingling, visual changes, syncope  Dermatological: Negative for rash    Objective:    Vitals:    11/15/21 1400   BP: 110/78   Pulse: 73   Weight: 136 lb (61.7 kg)   Height: 5' 7\" (1.702 m)     /78   Pulse 73   Ht 5' 7\" (1.702 m)   Wt 136 lb (61.7 kg)   BMI 21.30 kg/m²     Patient-Reported Vitals 11/16/2020   Patient-Reported Weight 148 lbs   Patient-Reported Height 5 7        Wt Readings from Last 3 Encounters:   11/15/21 136 lb (61.7 kg)   11/02/20 147 lb (66.7 kg)   09/14/20 151 lb (68.5 kg)     Body mass index is 21.3 kg/m². GENERAL - Alert, oriented, pleasant, in no apparent distress. EYES: No jaundice, no conjunctival pallor. SKIN: It is warm & dry. No rashes. No Echhymosis    HEENT - No clinically significant abnormalities seen. Neck - Supple. No jugular venous distention noted. No carotid bruits. Cardiovascular - Normal S1 and S2 without obvious murmur or gallop. Extremities - No cyanosis, clubbing, or significant edema. Pulmonary - No respiratory distress. No wheezes or rales. Abdomen - No masses, tenderness, or organomegaly. Musculoskeletal - No significant edema. No joint deformities. No muscle wasting. Neurologic - Cranial nerves II through XII are grossly intact. There were no gross focal neurologic abnormalities.     Lab Review   No results found for: CKTOTAL, CKMB, CKMBINDEX, TROPONINT  BNP:  No results found for: BNP  PT/INR:  No results found for: INR  No results found for: LABA1C  Lab Results   Component Value Date    WBC 6.4 09/03/2020    HCT 38.6 09/03/2020    MCV 98.0 09/03/2020     09/03/2020     Lab Results   Component Value Date    CHOL 232 (H) 10/14/2010    TRIG 226 (H) 10/14/2010    HDL 66 10/14/2010    LDLDIRECT 150 (H) 10/14/2010     Lab Results   Component Value Date    ALT 11 09/03/2020    AST 16 09/03/2020     BMP:    Lab Results   Component Value Date     09/03/2020    K 4.3 09/03/2020     09/03/2020    CO2 29 09/03/2020    BUN 15 09/03/2020    CREATININE 0.8 09/03/2020     CMP:   Lab Results Component Value Date     09/03/2020    K 4.3 09/03/2020     09/03/2020    CO2 29 09/03/2020    BUN 15 09/03/2020    PROT 7.2 09/03/2020    PROT 8.1 10/12/2011     TSH:    Lab Results   Component Value Date    TSH 1.22 09/03/2020    TSHHS 1.895 10/14/2010           Assessment & Plan:    - CP: normal EKG    - on chemo for MS has falls    - has crohn disease        Mayer Goodpasture MD    [facility] [place]

## 2021-11-15 NOTE — LETTER
[address] Nevin Muniz  1975  [id number]    Have you had any Chest Pain that is not new? - Yes  If Yes DO EKG - How does it feel - Tightness   How long does the pain last -  seconds    How long have you been having the pain - Years    Did you take a No    DO EKG IF: Patient has a Heart Rate above 100 or below 40     CAD (Coronary Artery Disease) patient should have one on file every 6 months      Have you had any Shortness of Breath - Yes  If Yes - When on exertion    Have you had any dizziness - No    Have you had any palpitations that are not new? - Yes  If Yes DO EKG - Do you feel your heart racing  How long does it last - .  seconds     Do you have any edema - swelling in No      When did you have your last labs drawn   Where did you have them done   What doctor ordered     If we do not have these labs you are retrieve these labs for these providers!     Do you have a surgery or procedure scheduled in the near future - Yes   [facility] scan at [facility] 11/16/2021 and Chemo the week of 11/22     Ask patient if they want to sign up for [employer] if they are not already signed up     Check to see if we have an E-MAIL on file for the patient     Check medication list thoroughly!!! AND RECONCILE OUTSIDE MEDICATIONS  If dose has changed change the entire order not just the MG  BE SURE TO ASK PATIENT IF THEY NEED MEDICATION REFILLS     At check out add to every patient's \"wrap up\" the following dot phrase AFTERHOURSEDUCATION and ensure we explain this to our patients

## 2022-01-06 ENCOUNTER — TELEPHONE (OUTPATIENT)
Dept: CARDIOLOGY CLINIC | Age: 47
End: 2022-01-06

## 2022-01-06 NOTE — TELEPHONE ENCOUNTER
Patient called her PCP has started her on Spirolactone and she has some questions if this is ok to take along with her other medications\ Savannah rose

## 2022-01-07 RX ORDER — SPIRONOLACTONE 25 MG/1
25 TABLET ORAL DAILY
COMMUNITY

## 2022-01-07 NOTE — TELEPHONE ENCOUNTER
Per Feliz Comment: Yes it will be OK with her other medications. Patient advised and voices understanding.

## 2022-01-21 ENCOUNTER — HOSPITAL ENCOUNTER (OUTPATIENT)
Age: 47
Discharge: HOME | End: 2022-01-21
Payer: MEDICARE

## 2022-01-21 LAB — TSH HIGH SENSITIVITY: 1.17 UIU/ML (ref 0.27–4.2)

## 2022-01-21 PROCEDURE — 84443 ASSAY THYROID STIM HORMONE: CPT

## 2022-01-21 PROCEDURE — 82670 ASSAY OF TOTAL ESTRADIOL: CPT

## 2022-01-21 PROCEDURE — 84436 ASSAY OF TOTAL THYROXINE: CPT

## 2022-01-21 PROCEDURE — 84479 ASSAY OF THYROID (T3 OR T4): CPT

## 2022-01-21 PROCEDURE — 36415 COLL VENOUS BLD VENIPUNCTURE: CPT

## 2022-01-23 LAB — ESTRADIOL LEVEL: 63 PG/ML

## 2022-01-24 LAB
T3 UPTAKE PERCENT: 32 % (ref 28–41)
T4 TOTAL: 6.72 UG/DL (ref 5.1–14.1)

## 2022-01-27 ENCOUNTER — HOSPITAL ENCOUNTER (OUTPATIENT)
Age: 47
Discharge: HOME | End: 2022-01-27
Payer: MEDICARE

## 2022-01-27 LAB
ANION GAP SERPL CALCULATED.3IONS-SCNC: 11 MMOL/L (ref 4–16)
CHLORIDE BLD-SCNC: 95 MMOL/L (ref 99–110)
CO2: 26 MMOL/L (ref 21–32)
POTASSIUM SERPL-SCNC: 4.5 MMOL/L (ref 3.5–5.1)
SODIUM BLD-SCNC: 132 MMOL/L (ref 135–145)

## 2022-01-27 PROCEDURE — 80051 ELECTROLYTE PANEL: CPT

## 2022-01-27 PROCEDURE — 36415 COLL VENOUS BLD VENIPUNCTURE: CPT

## 2022-04-29 ENCOUNTER — HOSPITAL ENCOUNTER (OUTPATIENT)
Age: 47
Discharge: HOME | End: 2022-04-29
Payer: MEDICARE

## 2022-04-29 LAB
ALBUMIN SERPL-MCNC: 4.4 GM/DL (ref 3.4–5)
ALP BLD-CCNC: 34 IU/L (ref 40–128)
ALT SERPL-CCNC: 16 U/L (ref 10–40)
ANION GAP SERPL CALCULATED.3IONS-SCNC: 10 MMOL/L (ref 4–16)
AST SERPL-CCNC: 18 IU/L (ref 15–37)
BASOPHILS ABSOLUTE: 0.1 K/CU MM
BASOPHILS RELATIVE PERCENT: 1 % (ref 0–1)
BILIRUB SERPL-MCNC: 0.2 MG/DL (ref 0–1)
BUN BLDV-MCNC: 20 MG/DL (ref 6–23)
CALCIUM SERPL-MCNC: 8.8 MG/DL (ref 8.3–10.6)
CHLORIDE BLD-SCNC: 101 MMOL/L (ref 99–110)
CO2: 26 MMOL/L (ref 21–32)
CREAT SERPL-MCNC: 0.5 MG/DL (ref 0.6–1.1)
DIFFERENTIAL TYPE: ABNORMAL
EOSINOPHILS ABSOLUTE: 0.1 K/CU MM
EOSINOPHILS RELATIVE PERCENT: 2.1 % (ref 0–3)
GFR AFRICAN AMERICAN: >60 ML/MIN/1.73M2
GFR NON-AFRICAN AMERICAN: >60 ML/MIN/1.73M2
GLUCOSE BLD-MCNC: 73 MG/DL (ref 70–99)
HCT VFR BLD CALC: 39.3 % (ref 37–47)
HEMOGLOBIN: 12.7 GM/DL (ref 12.5–16)
IGG,SERUM: 1316 MG/DL (ref 723–1685)
IMMATURE NEUTROPHIL %: 0.3 % (ref 0–0.43)
LYMPHOCYTES ABSOLUTE: 2.3 K/CU MM
LYMPHOCYTES RELATIVE PERCENT: 39.3 % (ref 24–44)
MCH RBC QN AUTO: 33 PG (ref 27–31)
MCHC RBC AUTO-ENTMCNC: 32.3 % (ref 32–36)
MCV RBC AUTO: 102.1 FL (ref 78–100)
MONOCYTES ABSOLUTE: 0.5 K/CU MM
MONOCYTES RELATIVE PERCENT: 7.7 % (ref 0–4)
NUCLEATED RBC %: 0 %
PDW BLD-RTO: 11.9 % (ref 11.7–14.9)
PLATELET # BLD: 254 K/CU MM (ref 140–440)
PMV BLD AUTO: 10.6 FL (ref 7.5–11.1)
POTASSIUM SERPL-SCNC: 4.6 MMOL/L (ref 3.5–5.1)
RBC # BLD: 3.85 M/CU MM (ref 4.2–5.4)
SEGMENTED NEUTROPHILS ABSOLUTE COUNT: 2.9 K/CU MM
SEGMENTED NEUTROPHILS RELATIVE PERCENT: 49.6 % (ref 36–66)
SODIUM BLD-SCNC: 137 MMOL/L (ref 135–145)
TOTAL IMMATURE NEUTOROPHIL: 0.02 K/CU MM
TOTAL NUCLEATED RBC: 0 K/CU MM
TOTAL PROTEIN: 6.9 GM/DL (ref 6.4–8.2)
VITAMIN D 25-HYDROXY: 60 NG/ML
WBC # BLD: 5.8 K/CU MM (ref 4–10.5)

## 2022-04-29 PROCEDURE — 82784 ASSAY IGA/IGD/IGG/IGM EACH: CPT

## 2022-04-29 PROCEDURE — 85025 COMPLETE CBC W/AUTO DIFF WBC: CPT

## 2022-04-29 PROCEDURE — 82306 VITAMIN D 25 HYDROXY: CPT

## 2022-04-29 PROCEDURE — 80053 COMPREHEN METABOLIC PANEL: CPT

## 2022-04-29 PROCEDURE — 36415 COLL VENOUS BLD VENIPUNCTURE: CPT

## 2022-08-15 ENCOUNTER — HOSPITAL ENCOUNTER (OUTPATIENT)
Age: 47
Discharge: HOME | End: 2022-08-15
Payer: COMMERCIAL

## 2022-08-15 LAB
FOLLICLE STIMULATING HORMONE: 32.1 MLU/ML
PROLACTIN: 27.5 NG/ML
TSH HIGH SENSITIVITY: 0.97 UIU/ML (ref 0.27–4.2)

## 2022-08-15 PROCEDURE — 84146 ASSAY OF PROLACTIN: CPT

## 2022-08-15 PROCEDURE — 36415 COLL VENOUS BLD VENIPUNCTURE: CPT

## 2022-08-15 PROCEDURE — 84443 ASSAY THYROID STIM HORMONE: CPT

## 2022-08-15 PROCEDURE — 84479 ASSAY OF THYROID (T3 OR T4): CPT

## 2022-08-15 PROCEDURE — 82627 DEHYDROEPIANDROSTERONE: CPT

## 2022-08-15 PROCEDURE — 83001 ASSAY OF GONADOTROPIN (FSH): CPT

## 2022-08-15 PROCEDURE — 82670 ASSAY OF TOTAL ESTRADIOL: CPT

## 2022-08-15 PROCEDURE — 84402 ASSAY OF FREE TESTOSTERONE: CPT

## 2022-08-15 PROCEDURE — 84436 ASSAY OF TOTAL THYROXINE: CPT

## 2022-08-17 LAB
DHEAS (DHEA SULFATE): 19 UG/DL (ref 35–256)
ESTRADIOL LEVEL: 237 PG/ML
T3 UPTAKE PERCENT: 33 % (ref 28–41)
T4 TOTAL: 5.56 UG/DL (ref 4.5–11.7)

## 2022-08-26 LAB — TESTOSTERONE FREE (PG/ML): <1 PG/ML (ref 1.1–5.8)

## 2022-10-06 LAB
CHOLESTEROL, TOTAL: 206 MG/DL (ref 0–199)
ESTRADIOL LEVEL: 44 PG/ML
FOLLICLE STIMULATING HORMONE: 31.1 MIU/ML
HDLC SERPL-MCNC: 85 MG/DL (ref 40–60)
LDL CHOLESTEROL CALCULATED: 106 MG/DL
LUTEINIZING HORMONE: 23.8 MIU/ML
PROGESTERONE LEVEL: <0.05 NG/ML
PROLACTIN: 5 NG/ML
T3 TOTAL: 0.94 NG/ML (ref 0.8–2)
T4 FREE: 1.2 NG/DL (ref 0.9–1.8)
TRIGL SERPL-MCNC: 74 MG/DL (ref 0–150)
TSH SERPL DL<=0.05 MIU/L-ACNC: 1.04 UIU/ML (ref 0.27–4.2)
VITAMIN D 25-HYDROXY: 77.7 NG/ML
VLDLC SERPL CALC-MCNC: 15 MG/DL

## 2022-10-08 LAB — TESTOSTERONE TOTAL: 6 NG/DL (ref 20–70)

## 2022-10-10 LAB — DHEAS (DHEA SULFATE): 63.9 UG/DL (ref 32–240)

## 2022-10-12 LAB — DIHYDROTESTOSTERONE: 66.9 PG/ML (ref 24–208)

## 2022-11-09 ENCOUNTER — HOSPITAL ENCOUNTER (OUTPATIENT)
Age: 47
Discharge: HOME | End: 2022-11-09
Payer: COMMERCIAL

## 2022-11-09 LAB
PROCALCITONIN: 0.02
TSH HIGH SENSITIVITY: 0.96 UIU/ML (ref 0.27–4.2)

## 2022-11-09 PROCEDURE — 84145 PROCALCITONIN (PCT): CPT

## 2022-11-09 PROCEDURE — 84443 ASSAY THYROID STIM HORMONE: CPT

## 2022-11-09 PROCEDURE — 82627 DEHYDROEPIANDROSTERONE: CPT

## 2022-11-09 PROCEDURE — 36415 COLL VENOUS BLD VENIPUNCTURE: CPT

## 2022-11-11 LAB — DHEAS (DHEA SULFATE): 94 UG/DL (ref 35–256)

## 2022-12-02 ENCOUNTER — TELEPHONE (OUTPATIENT)
Dept: INFUSION THERAPY | Age: 47
End: 2022-12-02

## 2022-12-02 NOTE — TELEPHONE ENCOUNTER
Received message from \"Emi\" from [facility] inquiring about placing a PICC line in patient.  Will forward information to PICC team.

## 2023-01-04 ENCOUNTER — HOSPITAL ENCOUNTER (OUTPATIENT)
Age: 48
Discharge: HOME | End: 2023-01-04
Payer: MEDICARE

## 2023-01-04 PROCEDURE — 36415 COLL VENOUS BLD VENIPUNCTURE: CPT

## 2023-01-04 PROCEDURE — 86225 DNA ANTIBODY NATIVE: CPT

## 2023-01-06 LAB — ANTI DNA DOUBLE STRANDED: 3 IU (ref 0–24)

## 2023-05-19 ENCOUNTER — HOSPITAL ENCOUNTER (OUTPATIENT)
Age: 48
Discharge: HOME | End: 2023-05-19
Payer: MEDICARE

## 2023-05-19 LAB
FSH SERPL-ACNC: 20.1 MLU/ML
PROLACTIN SERPL IA-MCNC: 4.3 NG/ML

## 2023-05-19 PROCEDURE — 84146 ASSAY OF PROLACTIN: CPT

## 2023-05-19 PROCEDURE — 84144 ASSAY OF PROGESTERONE: CPT

## 2023-05-19 PROCEDURE — 36415 COLL VENOUS BLD VENIPUNCTURE: CPT

## 2023-05-19 PROCEDURE — 82627 DEHYDROEPIANDROSTERONE: CPT

## 2023-05-19 PROCEDURE — 84402 ASSAY OF FREE TESTOSTERONE: CPT

## 2023-05-19 PROCEDURE — 83001 ASSAY OF GONADOTROPIN (FSH): CPT

## 2023-05-21 LAB — DHEA-S SERPL-MCNC: 83 UG/DL (ref 35–256)

## 2023-05-24 LAB — PROGEST SERPL-MCNC: <0.1 NG/ML

## 2023-05-25 LAB — TESTOST FREE SERPL-MCNC: 0.7 PG/ML (ref 1.1–5.8)

## 2023-06-06 ENCOUNTER — HOSPITAL ENCOUNTER (OUTPATIENT)
Age: 48
Setting detail: SPECIMEN
Discharge: HOME | End: 2023-06-06
Payer: MEDICARE

## 2023-06-06 LAB
T4 FREE SERPL-MCNC: 1.15 NG/DL (ref 0.9–1.8)
TSH SERPL DL<=0.005 MIU/L-ACNC: 2.65 UIU/ML (ref 0.27–4.2)

## 2023-06-06 PROCEDURE — 84443 ASSAY THYROID STIM HORMONE: CPT

## 2023-06-06 PROCEDURE — 84479 ASSAY OF THYROID (T3 OR T4): CPT

## 2023-06-06 PROCEDURE — 84439 ASSAY OF FREE THYROXINE: CPT

## 2023-06-06 PROCEDURE — 82670 ASSAY OF TOTAL ESTRADIOL: CPT

## 2023-06-08 LAB
ESTRADIOL SERPL HS-MCNC: NORMAL PG/ML
ESTRADIOL SERPL-MCNC: 41 PG/ML
T3RU NFR SERPL: 1 TBI (ref 0.8–1.3)

## 2023-12-07 ENCOUNTER — HOSPITAL ENCOUNTER (OUTPATIENT)
Age: 48
Discharge: HOME | End: 2023-12-07
Payer: MEDICARE

## 2023-12-07 LAB
ALBUMIN SERPL-MCNC: 4.4 GM/DL (ref 3.4–5)
ALP BLD-CCNC: 27 IU/L (ref 40–128)
ALT SERPL-CCNC: 15 U/L (ref 10–40)
ANION GAP SERPL CALCULATED.3IONS-SCNC: 10 MMOL/L (ref 4–16)
AST SERPL-CCNC: 18 IU/L (ref 15–37)
BASOPHILS ABSOLUTE: 0 K/CU MM
BASOPHILS RELATIVE PERCENT: 0.5 % (ref 0–1)
BILIRUB SERPL-MCNC: 0.2 MG/DL (ref 0–1)
BUN SERPL-MCNC: 17 MG/DL (ref 6–23)
CALCIUM SERPL-MCNC: 9.6 MG/DL (ref 8.3–10.6)
CHLORIDE BLD-SCNC: 97 MMOL/L (ref 99–110)
CO2: 29 MMOL/L (ref 21–32)
CREAT SERPL-MCNC: 0.7 MG/DL (ref 0.6–1.1)
DIFFERENTIAL TYPE: ABNORMAL
EOSINOPHILS ABSOLUTE: 0 K/CU MM
EOSINOPHILS RELATIVE PERCENT: 0.5 % (ref 0–3)
GFR SERPL CREATININE-BSD FRML MDRD: >60 ML/MIN/1.73M2
GLUCOSE SERPL-MCNC: 73 MG/DL (ref 70–99)
HCT VFR BLD CALC: 37.1 % (ref 37–47)
HEMOGLOBIN: 12.6 GM/DL (ref 12.5–16)
IMMATURE NEUTROPHIL %: 0.2 % (ref 0–0.43)
LYMPHOCYTES ABSOLUTE: 2.6 K/CU MM
LYMPHOCYTES RELATIVE PERCENT: 29.6 % (ref 24–44)
MCH RBC QN AUTO: 33.2 PG (ref 27–31)
MCHC RBC AUTO-ENTMCNC: 34 % (ref 32–36)
MCV RBC AUTO: 97.9 FL (ref 78–100)
MONOCYTES ABSOLUTE: 0.6 K/CU MM
MONOCYTES RELATIVE PERCENT: 6.6 % (ref 0–4)
NUCLEATED RBC %: 0 %
PDW BLD-RTO: 11.9 % (ref 11.7–14.9)
PLATELET # BLD: 227 K/CU MM (ref 140–440)
PMV BLD AUTO: 10.5 FL (ref 7.5–11.1)
POTASSIUM SERPL-SCNC: 3.7 MMOL/L (ref 3.5–5.1)
RBC # BLD: 3.79 M/CU MM (ref 4.2–5.4)
SEGMENTED NEUTROPHILS ABSOLUTE COUNT: 5.5 K/CU MM
SEGMENTED NEUTROPHILS RELATIVE PERCENT: 62.6 % (ref 36–66)
SODIUM BLD-SCNC: 136 MMOL/L (ref 135–145)
TOTAL IMMATURE NEUTOROPHIL: 0.02 K/CU MM
TOTAL NUCLEATED RBC: 0 K/CU MM
TOTAL PROTEIN: 6.8 GM/DL (ref 6.4–8.2)
WBC # BLD: 8.7 K/CU MM (ref 4–10.5)

## 2023-12-07 PROCEDURE — 36415 COLL VENOUS BLD VENIPUNCTURE: CPT

## 2023-12-07 PROCEDURE — 85025 COMPLETE CBC W/AUTO DIFF WBC: CPT

## 2023-12-07 PROCEDURE — 80053 COMPREHEN METABOLIC PANEL: CPT

## 2023-12-11 ENCOUNTER — HOSPITAL ENCOUNTER (OUTPATIENT)
Age: 48
Discharge: HOME | End: 2023-12-11
Payer: MEDICARE

## 2023-12-11 PROCEDURE — 88185 FLOWCYTOMETRY/TC ADD-ON: CPT

## 2023-12-11 PROCEDURE — 36415 COLL VENOUS BLD VENIPUNCTURE: CPT

## 2023-12-11 PROCEDURE — 88184 FLOWCYTOMETRY/ TC 1 MARKER: CPT

## 2023-12-13 LAB
CD3 CELLS # BLD: 1798 CELLS/UL (ref 570–2400)
CD3 CELLS NFR SPEC: 86 % (ref 62–87)
CD3+CD4+ CELLS # BLD: 933 CELLS/UL (ref 430–1800)
CD3+CD4+ CELLS NFR BLD: 45 % (ref 32–64)
CD3+CD4+ CELLS/CD3+CD8+ CLL BLD: 1.1 RATIO (ref 0.8–3.9)
CD3+CD8+ CELLS # BLD: 851 CELLS/UL (ref 210–1200)
CD3+CD8+ CELLS NFR SPEC: 41 % (ref 15–46)

## 2024-04-12 ENCOUNTER — HOSPITAL ENCOUNTER (OUTPATIENT)
Age: 49
Discharge: HOME | End: 2024-04-12
Payer: MEDICARE

## 2024-04-12 LAB
HBV SURFACE AG SERPL QL IA: NON REACTIVE
IGG,SERUM: 1430 MG/DL (ref 723–1685)
IGM,SERUM: <25 MG/DL (ref 62–277)

## 2024-04-12 PROCEDURE — 86704 HEP B CORE ANTIBODY TOTAL: CPT

## 2024-04-12 PROCEDURE — 86480 TB TEST CELL IMMUN MEASURE: CPT

## 2024-04-12 PROCEDURE — 76937 US GUIDE VASCULAR ACCESS: CPT

## 2024-04-12 PROCEDURE — 87340 HEPATITIS B SURFACE AG IA: CPT

## 2024-04-12 PROCEDURE — 82784 ASSAY IGA/IGD/IGG/IGM EACH: CPT

## 2024-04-13 LAB — HBV CORE AB SERPL QL IA: NEGATIVE

## 2024-04-15 LAB
QUANTI TB1 MINUS NIL: 0.01 IU/ML (ref 0–0.34)
QUANTI TB2 MINUS NIL: 0.03 IU/ML (ref 0–0.34)
QUANTIFERON (R) TB GOLD (INCUBATED): NEGATIVE IU/ML
QUANTIFERON MITOGEN MINUS NIL: 9.71 IU/ML
QUANTIFERON NIL: 0.05 IU/ML

## 2024-05-01 LAB
ALBUMIN: 4.5 G/DL (ref 3.5–5.7)
ALBUMIN: 4.5 G/DL (ref 3.5–5.7)
ALP BLD-CCNC: 28 U/L (ref 34–104)
ALT SERPL-CCNC: 16 U/L (ref 7–52)
ANION GAP SERPL CALCULATED.3IONS-SCNC: 12 MMOL/L (ref 7–16)
AST SERPL-CCNC: 21 U/L (ref 13–39)
BASOPHILS ABSOLUTE: 0 K/UL (ref 0–0.1)
BASOPHILS RELATIVE PERCENT: 0.7 %
BILIRUB SERPL-MCNC: 0.4 MG/DL (ref 0.3–1)
BILIRUBIN DIRECT: 0.06 MG/DL
BUN BLDV-MCNC: 15 MG/DL (ref 7–25)
CALCIUM SERPL-MCNC: 8.9 MG/DL (ref 8.6–10.2)
CHLORIDE BLD-SCNC: 102 MMOL/L (ref 98–107)
CO2: 24 MMOL/L (ref 21–31)
CREAT SERPL-MCNC: 0.81 MG/DL (ref 0.6–1.2)
EGFR FEMALE: 88 ML/MIN/1.73M2
EOSINOPHILS ABSOLUTE: 0.1 K/UL (ref 0–0.4)
EOSINOPHILS RELATIVE PERCENT: 1.7 %
GLUCOSE BLD-MCNC: 88 MG/DL (ref 74–109)
HCT VFR BLD CALC: 41.4 % (ref 35.8–46.5)
HEMOGLOBIN: 14.1 G/DL (ref 12.1–15.8)
LYMPHOCYTES ABSOLUTE: 2.4 K/UL (ref 0.8–3.6)
LYMPHOCYTES RELATIVE PERCENT: 49.1 %
MAGNESIUM: 2.3 MG/DL (ref 1.6–2.4)
MCH RBC QN AUTO: 33.4 PG (ref 28.4–33.4)
MCHC RBC AUTO-ENTMCNC: 34.2 G/DL (ref 31.1–37)
MCV RBC AUTO: 97.6 FL (ref 85–99)
MONOCYTES ABSOLUTE: 0.5 K/UL (ref 0.3–0.9)
MONOCYTES RELATIVE PERCENT: 10.4 %
NEUTROPHILS ABSOLUTE: 1.8 K/UL (ref 2–7.3)
NEUTROPHILS RELATIVE PERCENT: 38.1 %
PDW BLD-RTO: 13.4 % (ref 11.7–15.2)
PHOSPHORUS: 4.6 MG/DL (ref 2.5–5)
PLATELET # BLD: 181 K/UL (ref 154–393)
POTASSIUM SERPL-SCNC: 3.7 MMOL/L (ref 3.5–5.1)
RBC # BLD: 4.24 M/UL (ref 3.86–5.17)
SODIUM BLD-SCNC: 138 MMOL/L (ref 136–145)
TOTAL PROTEIN: 7.8 G/DL (ref 6–8.3)
WBC # BLD: 4.8 K/UL (ref 4–10.5)

## 2025-05-09 ENCOUNTER — HOSPITAL ENCOUNTER (OUTPATIENT)
Age: 50
Discharge: HOME | End: 2025-05-09
Payer: MEDICARE

## 2025-05-09 LAB
PROLACTIN SERPL-MCNC: 37.4 NG/ML
TSH SERPL DL<=0.05 MIU/L-ACNC: 1.39 UIU/ML (ref 0.27–4.2)

## 2025-05-09 PROCEDURE — 84443 ASSAY THYROID STIM HORMONE: CPT

## 2025-05-09 PROCEDURE — 84146 ASSAY OF PROLACTIN: CPT

## 2025-07-05 ENCOUNTER — APPOINTMENT (OUTPATIENT)
Dept: CT IMAGING | Age: 50
End: 2025-07-05
Payer: OTHER MISCELLANEOUS

## 2025-07-05 ENCOUNTER — HOSPITAL ENCOUNTER (EMERGENCY)
Age: 50
Discharge: HOME | End: 2025-07-05
Attending: EMERGENCY MEDICINE
Payer: OTHER MISCELLANEOUS

## 2025-07-05 VITALS
TEMPERATURE: 97.9 F | OXYGEN SATURATION: 100 % | HEIGHT: 68 IN | BODY MASS INDEX: 20.46 KG/M2 | SYSTOLIC BLOOD PRESSURE: 141 MMHG | WEIGHT: 135 LBS | DIASTOLIC BLOOD PRESSURE: 93 MMHG | RESPIRATION RATE: 19 BRPM | HEART RATE: 71 BPM
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
## 2025-07-05 DIAGNOSIS — S39.012A STRAIN OF LUMBAR REGION, INITIAL ENCOUNTER: ICD-10-CM

## 2025-07-05 DIAGNOSIS — R11.2 NAUSEA AND VOMITING, UNSPECIFIED VOMITING TYPE: ICD-10-CM

## 2025-07-05 LAB
ABO + RH BLD: NORMAL
ALBUMIN SERPL-MCNC: 4.6 G/DL (ref 3.4–5)
ALBUMIN/GLOB SERPL: 1.7 {RATIO} (ref 1.1–2.2)
ALP SERPL-CCNC: 32 U/L (ref 40–129)
ALT SERPL-CCNC: 20 U/L (ref 10–40)
ANION GAP SERPL CALCULATED.3IONS-SCNC: 12 MMOL/L (ref 9–17)
AST SERPL-CCNC: 22 U/L (ref 15–37)
BASOPHILS # BLD: 0.06 K/UL
BASOPHILS NFR BLD: 1 % (ref 0–1)
BILIRUB DIRECT SERPL-MCNC: <0.2 MG/DL (ref 0–0.3)
BILIRUB INDIRECT SERPL-MCNC: ABNORMAL MG/DL (ref 0–0.7)
BILIRUB SERPL-MCNC: 0.3 MG/DL (ref 0–1)
BLOOD BANK SAMPLE EXPIRATION: NORMAL
BLOOD GROUP ANTIBODIES SERPL: NEGATIVE
BUN SERPL-MCNC: 22 MG/DL (ref 7–20)
CALCIUM SERPL-MCNC: 9.8 MG/DL (ref 8.3–10.6)
CHLORIDE SERPL-SCNC: 100 MMOL/L (ref 99–110)
CO2 SERPL-SCNC: 25 MMOL/L (ref 21–32)
CREAT SERPL-MCNC: 0.7 MG/DL (ref 0.6–1.1)
EKG ATRIAL RATE: 74 BPM
EKG DIAGNOSIS: NORMAL
EKG P AXIS: 68 DEGREES
EKG P-R INTERVAL: 130 MS
EKG Q-T INTERVAL: 384 MS
EKG QRS DURATION: 84 MS
EKG QTC CALCULATION (BAZETT): 426 MS
EKG R AXIS: 61 DEGREES
EKG T AXIS: 64 DEGREES
EKG VENTRICULAR RATE: 74 BPM
EOSINOPHIL # BLD: 0.01 K/UL
EOSINOPHILS RELATIVE PERCENT: 0 % (ref 0–3)
ERYTHROCYTE [DISTWIDTH] IN BLOOD BY AUTOMATED COUNT: 11.9 % (ref 11.7–14.9)
GFR, ESTIMATED: 87 ML/MIN/1.73M2
GLUCOSE SERPL-MCNC: 89 MG/DL (ref 74–99)
HCT VFR BLD AUTO: 41.2 % (ref 37–47)
HGB BLD-MCNC: 13.7 G/DL (ref 12.5–16)
IMM GRANULOCYTES # BLD AUTO: 0.01 K/UL
IMM GRANULOCYTES NFR BLD: 0 %
INR PPP: 1
LYMPHOCYTES NFR BLD: 1.86 K/UL
LYMPHOCYTES RELATIVE PERCENT: 15 % (ref 24–44)
MCH RBC QN AUTO: 32.9 PG (ref 27–31)
MCHC RBC AUTO-ENTMCNC: 33.3 G/DL (ref 32–36)
MCV RBC AUTO: 99 FL (ref 78–100)
MONOCYTES NFR BLD: 0.49 K/UL
MONOCYTES NFR BLD: 4 % (ref 0–5)
NEUTROPHILS NFR BLD: 81 % (ref 36–66)
NEUTS SEG NFR BLD: 10.13 K/UL
PARTIAL THROMBOPLASTIN TIME: 28.7 SEC (ref 25.1–37.1)
PLATELET # BLD AUTO: 250 K/UL (ref 140–440)
PMV BLD AUTO: 10.4 FL (ref 7.5–11.1)
POTASSIUM SERPL-SCNC: 4.8 MMOL/L (ref 3.5–5.1)
PROT SERPL-MCNC: 7.3 G/DL (ref 6.4–8.2)
PROTHROMBIN TIME: 13.2 SEC (ref 11.7–14.5)
RBC # BLD AUTO: 4.16 M/UL (ref 4.2–5.4)
SODIUM SERPL-SCNC: 137 MMOL/L (ref 136–145)
TROPONIN I SERPL HS-MCNC: <6 NG/L (ref 0–14)
WBC OTHER # BLD: 12.6 K/UL (ref 4–10.5)

## 2025-07-05 PROCEDURE — 86900 BLOOD TYPING SEROLOGIC ABO: CPT

## 2025-07-05 PROCEDURE — 99285 EMERGENCY DEPT VISIT HI MDM: CPT

## 2025-07-05 PROCEDURE — 82248 BILIRUBIN DIRECT: CPT

## 2025-07-05 PROCEDURE — 93010 ELECTROCARDIOGRAM REPORT: CPT | Performed by: INTERNAL MEDICINE

## 2025-07-05 PROCEDURE — 96374 THER/PROPH/DIAG INJ IV PUSH: CPT

## 2025-07-05 PROCEDURE — 6360000004 HC RX CONTRAST MEDICATION: Performed by: EMERGENCY MEDICINE

## 2025-07-05 PROCEDURE — 80053 COMPREHEN METABOLIC PANEL: CPT

## 2025-07-05 PROCEDURE — 85730 THROMBOPLASTIN TIME PARTIAL: CPT

## 2025-07-05 PROCEDURE — 74177 CT ABD & PELVIS W/CONTRAST: CPT

## 2025-07-05 PROCEDURE — 93005 ELECTROCARDIOGRAM TRACING: CPT | Performed by: EMERGENCY MEDICINE

## 2025-07-05 PROCEDURE — 85610 PROTHROMBIN TIME: CPT

## 2025-07-05 PROCEDURE — 84484 ASSAY OF TROPONIN QUANT: CPT

## 2025-07-05 PROCEDURE — 86901 BLOOD TYPING SEROLOGIC RH(D): CPT

## 2025-07-05 PROCEDURE — 85025 COMPLETE CBC W/AUTO DIFF WBC: CPT

## 2025-07-05 PROCEDURE — 86850 RBC ANTIBODY SCREEN: CPT

## 2025-07-05 PROCEDURE — 6360000002 HC RX W HCPCS: Performed by: EMERGENCY MEDICINE

## 2025-07-05 RX ORDER — MORPHINE SULFATE 4 MG/ML
4 INJECTION, SOLUTION INTRAMUSCULAR; INTRAVENOUS ONCE
Status: DISCONTINUED | OUTPATIENT
Start: 2025-07-05 | End: 2025-07-05 | Stop reason: HOSPADM

## 2025-07-05 RX ORDER — MORPHINE SULFATE 4 MG/ML
4 INJECTION, SOLUTION INTRAMUSCULAR; INTRAVENOUS ONCE
Status: COMPLETED | OUTPATIENT
Start: 2025-07-05 | End: 2025-07-05

## 2025-07-05 RX ORDER — LIDOCAINE 50 MG/G
1 PATCH TOPICAL DAILY
Qty: 30 PATCH | Refills: 0 | Status: SHIPPED | OUTPATIENT
Start: 2025-07-05 | End: 2025-08-04

## 2025-07-05 RX ORDER — IOPAMIDOL 755 MG/ML
75 INJECTION, SOLUTION INTRAVASCULAR
Status: COMPLETED | OUTPATIENT
Start: 2025-07-05 | End: 2025-07-05

## 2025-07-05 RX ORDER — ONDANSETRON 2 MG/ML
4 INJECTION INTRAMUSCULAR; INTRAVENOUS EVERY 30 MIN PRN
Status: DISCONTINUED | OUTPATIENT
Start: 2025-07-05 | End: 2025-07-05 | Stop reason: HOSPADM

## 2025-07-05 RX ORDER — ONDANSETRON 4 MG/1
4 TABLET, ORALLY DISINTEGRATING ORAL 3 TIMES DAILY PRN
Qty: 21 TABLET | Refills: 0 | Status: SHIPPED | OUTPATIENT
Start: 2025-07-05

## 2025-07-05 RX ADMIN — IOPAMIDOL 75 ML: 755 INJECTION, SOLUTION INTRAVENOUS at 12:28

## 2025-07-05 RX ADMIN — MORPHINE SULFATE 4 MG: 4 INJECTION, SOLUTION INTRAMUSCULAR; INTRAVENOUS at 11:38

## 2025-07-05 ASSESSMENT — PAIN SCALES - GENERAL
PAINLEVEL_OUTOF10: 10
PAINLEVEL_OUTOF10: 8
PAINLEVEL_OUTOF10: 9

## 2025-07-05 ASSESSMENT — PAIN DESCRIPTION - LOCATION
LOCATION: ABDOMEN;BACK;HIP
LOCATION: GENERALIZED
LOCATION: BACK;ABDOMEN
LOCATION: GENERALIZED

## 2025-07-05 ASSESSMENT — PAIN DESCRIPTION - ORIENTATION: ORIENTATION: RIGHT;LEFT

## 2025-07-05 ASSESSMENT — PAIN - FUNCTIONAL ASSESSMENT: PAIN_FUNCTIONAL_ASSESSMENT: 0-10
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
